# Patient Record
Sex: MALE | Race: WHITE
[De-identification: names, ages, dates, MRNs, and addresses within clinical notes are randomized per-mention and may not be internally consistent; named-entity substitution may affect disease eponyms.]

---

## 2019-08-29 ENCOUNTER — HOSPITAL ENCOUNTER (OUTPATIENT)
Dept: HOSPITAL 39 - RAD | Age: 64
End: 2019-08-29
Attending: ORTHOPAEDIC SURGERY
Payer: COMMERCIAL

## 2019-08-29 DIAGNOSIS — M22.2X1: Primary | ICD-10-CM

## 2019-08-29 DIAGNOSIS — R60.0: ICD-10-CM

## 2019-08-29 DIAGNOSIS — M25.861: ICD-10-CM

## 2019-08-29 DIAGNOSIS — M25.551: ICD-10-CM

## 2019-08-29 NOTE — RAD
EXAM DESCRIPTION: Knee,Right Complete



CLINICAL HISTORY: 64 years Male, PAIN IN RIGHT KNEE



COMPARISON: None.



TECHNIQUE: 4 views of the right knee.



IMPRESSION:

No acute displaced fracture. No dislocation.



Severe narrowing of the medial weightbearing joint space with

mild underlying subchondral sclerosis along the tibial plateau.

Small marginal osteophytes extend off the femoral condyles and

tibial plateau. The tibial plateau is intact. 



Mild patellofemoral arthrosis. Probable small knee joint

effusion. Lateral patellar tilt.



Mild July 16 edema. No radiopaque foreign body.  



Electronically signed by:  Yosvany Fisher MD  8/29/2019 1:59 PM CDT

Workstation: [id number]

## 2019-08-29 NOTE — RAD
EXAM DESCRIPTION: 



Pelvis



CLINICAL HISTORY: 



PAIN IN RIGHT HIP



COMPARISON: 



None.



IMPRESSION: 





AP supine view of the pelvis shows no acute fracture, focal bone

destruction, or joint dislocation.



No advanced arthrosis.





Electronically signed by:  Wallace Berry MD  8/29/2019 2:02 PM CDT

Workstation: [id number]

## 2019-12-09 ENCOUNTER — HOSPITAL ENCOUNTER (OUTPATIENT)
Dept: HOSPITAL 39 - LAB.O | Age: 64
End: 2019-12-09
Attending: NURSE PRACTITIONER
Payer: OTHER GOVERNMENT

## 2019-12-09 DIAGNOSIS — Z01.810: Primary | ICD-10-CM

## 2019-12-09 DIAGNOSIS — R91.8: ICD-10-CM

## 2019-12-09 NOTE — RAD
EXAM DESCRIPTION: 



Chest,2 Views



CLINICAL HISTORY: 



PRESURGICAL CLEARANCE



COMPARISON: 



None



FINDINGS: 



3 views of the chest.

No consolidation, effusion or pneumothorax is present.

Usual physiologic wedging of the midthoracic vertebral bodies.

Heart size is normal.

Flattening of the hemidiaphragms can be seen with good depth of

inspiration or COPD.



IMPRESSION: 



Good depth of inspiration versus COPD.

No acute radiographic abnormality.



Electronically signed by:  Randal Craig MD  12/9/2019 1:32 PM

[facility] Workstation: [id number]

## 2020-01-07 ENCOUNTER — HOSPITAL ENCOUNTER (OUTPATIENT)
Dept: HOSPITAL 39 - AMB | Age: 65
Discharge: HOME | End: 2020-01-07
Attending: ORTHOPAEDIC SURGERY
Payer: COMMERCIAL

## 2020-01-07 VITALS — TEMPERATURE: 98.3 F | OXYGEN SATURATION: 97 % | DIASTOLIC BLOOD PRESSURE: 83 MMHG | SYSTOLIC BLOOD PRESSURE: 131 MMHG

## 2020-01-07 DIAGNOSIS — Z53.9: ICD-10-CM

## 2020-01-07 DIAGNOSIS — M17.11: Primary | ICD-10-CM

## 2020-03-04 ENCOUNTER — HOSPITAL ENCOUNTER (INPATIENT)
Dept: HOSPITAL 39 - AMB | Age: 65
LOS: 2 days | Discharge: HOME | DRG: 470 | End: 2020-03-06
Attending: NURSE PRACTITIONER | Admitting: ORTHOPAEDIC SURGERY
Payer: COMMERCIAL

## 2020-03-04 DIAGNOSIS — Z88.0: ICD-10-CM

## 2020-03-04 DIAGNOSIS — Z79.1: ICD-10-CM

## 2020-03-04 DIAGNOSIS — Z79.899: ICD-10-CM

## 2020-03-04 DIAGNOSIS — E66.9: ICD-10-CM

## 2020-03-04 DIAGNOSIS — M17.11: Primary | ICD-10-CM

## 2020-03-04 DIAGNOSIS — F17.290: ICD-10-CM

## 2020-03-04 DIAGNOSIS — I48.20: ICD-10-CM

## 2020-03-04 DIAGNOSIS — H81.10: ICD-10-CM

## 2020-03-04 DIAGNOSIS — I95.9: ICD-10-CM

## 2020-03-04 DIAGNOSIS — M81.0: ICD-10-CM

## 2020-03-04 DIAGNOSIS — I10: ICD-10-CM

## 2020-03-04 DIAGNOSIS — E78.5: ICD-10-CM

## 2020-03-04 DIAGNOSIS — Z96.652: ICD-10-CM

## 2020-03-04 DIAGNOSIS — Z79.01: ICD-10-CM

## 2020-03-04 PROCEDURE — 3E0T3BZ INTRODUCTION OF ANESTHETIC AGENT INTO PERIPHERAL NERVES AND PLEXI, PERCUTANEOUS APPROACH: ICD-10-PCS

## 2020-03-04 PROCEDURE — 0SRC0J9 REPLACEMENT OF RIGHT KNEE JOINT WITH SYNTHETIC SUBSTITUTE, CEMENTED, OPEN APPROACH: ICD-10-PCS | Performed by: ORTHOPAEDIC SURGERY

## 2020-03-04 PROCEDURE — 3E0T33Z INTRODUCTION OF ANTI-INFLAMMATORY INTO PERIPHERAL NERVES AND PLEXI, PERCUTANEOUS APPROACH: ICD-10-PCS

## 2020-03-04 RX ADMIN — DOCUSATE CALCIUM SCH MG: 240 CAPSULE, LIQUID FILLED ORAL at 20:15

## 2020-03-04 RX ADMIN — METOPROLOL TARTRATE SCH MG: 50 TABLET ORAL at 20:14

## 2020-03-04 RX ADMIN — BUPIVACAINE ONE MG: 13.3 INJECTION, SUSPENSION, LIPOSOMAL INFILTRATION at 08:32

## 2020-03-04 RX ADMIN — CEFAZOLIN SCH MLS/HR: 1 INJECTION, POWDER, FOR SOLUTION INTRAMUSCULAR; INTRAVENOUS at 17:22

## 2020-03-04 RX ADMIN — VANCOMYCIN HYDROCHLORIDE SCH MLS/HR: 500 INJECTION, POWDER, LYOPHILIZED, FOR SOLUTION INTRAVENOUS at 17:53

## 2020-03-04 RX ADMIN — BUPIVACAINE HYDROCHLORIDE ONE ML: 5 INJECTION, SOLUTION EPIDURAL; INTRACAUDAL at 07:55

## 2020-03-04 RX ADMIN — VANCOMYCIN HYDROCHLORIDE ONE MG: 500 INJECTION, POWDER, LYOPHILIZED, FOR SOLUTION INTRAVENOUS at 07:55

## 2020-03-04 RX ADMIN — CELECOXIB SCH MG: 100 CAPSULE ORAL at 17:22

## 2020-03-04 RX ADMIN — CELECOXIB SCH: 100 CAPSULE ORAL at 12:57

## 2020-03-04 RX ADMIN — BUPIVACAINE ONE MG: 13.3 INJECTION, SUSPENSION, LIPOSOMAL INFILTRATION at 07:55

## 2020-03-04 RX ADMIN — Medication SCH: at 12:57

## 2020-03-04 RX ADMIN — ENOXAPARIN SODIUM SCH MG: 30 INJECTION, SOLUTION INTRAVENOUS; SUBCUTANEOUS at 22:55

## 2020-03-04 RX ADMIN — VANCOMYCIN HYDROCHLORIDE ONE MG: 500 INJECTION, POWDER, LYOPHILIZED, FOR SOLUTION INTRAVENOUS at 09:04

## 2020-03-04 RX ADMIN — BUPIVACAINE HYDROCHLORIDE ONE ML: 5 INJECTION, SOLUTION EPIDURAL; INTRACAUDAL at 08:32

## 2020-03-04 NOTE — RAD
Right knee 2 views



INDICATION: Total knee arthroplasty



IMPRESSION:



There is lucency possibly small fracture along the lateral margin

lateral femoral condyle on the AP film. Recommend follow-up

radiographs. Status post total knee arthroplasty. Mild

patellofemoral osteophyte formation.



Electronically signed by:  Will Rios MD  3/4/2020 2:39 PM

[facility] Workstation: [id number]

## 2020-03-04 NOTE — RAD
EXAM DESCRIPTION: Fluoroscopy Up to 1Hr



CLINICAL HISTORY: 64 years Male, RIGHT TKA



COMPARISON: None.



IMPRESSION: 

Multiple intraoperative fluoroscopic images saved for the benefit

of the surgeon. 



Total right knee arthroplasty.



Please see procedure report for full details.



Fluoroscopy time: 6.2 seconds

Fluoroscopic images: 1

Total dose: 1.02 mGy



Electronically signed by:  Yosvany Fisher MD  3/4/2020 10:27 PM [facility]

Workstation: [id number]

## 2020-03-04 NOTE — OP
DATE OF PROCEDURE:  03/04/20



PREOPERATIVE DIAGNOSIS: 

1.  Osteoarthritis of the right knee.



POSTOPERATIVE DIAGNOSIS: 

1.  Osteoarthritis of the right knee.



PROCEDURE: 

1.  Total knee arthroplasty.



SURGEON:  Robbin Robins MD.



ASSISTANT:  Jonah Maurer CST, SA-C.



ANESTHESIA:  General anesthesia.



COMPLICATIONS:  None.



FINDINGS:  Severe arthritis of the knee.



INDICATION:  Michael has a history of severe knee pain.  He has had difficulty 
with difficulty activities because of worsening of his discomfort.  We discussed
options and after discussing the risks, benefits and alternatives to operative 
therapy, the patient has given informed consent.



PROCEDURE:  The patient was brought to the Operating Room and placed in supine 
position.  General anesthesia was induced and the patient's leg was sterilely 
prepped and draped.  Following prepping and draping, the distal femur was 
exposed and using an intramedullary guide, the distal femoral cut was made.  The
appropriate sized cutting block was measured, pinned into place, and the 
anterior, posterior, and chamfer cuts were made.  The ACL was transected and the
tibia was subluxed.  Both the medial and lateral menisci were removed.  An 
intramedullary guide was used to make the proximal tibial cut.  The appropriate 
sized base plate was placed and a trial polyethylene was placed.  The trial 
femur was placed, the knee was reduced, and the knee was taken through a range 
of motion.  The knee was stable in anterior, posterior, varus and valgus stress.
 The patella tracked anatomically without evidence of subluxation or 
dislocation.  After trialing, the trial components were removed and the bony 
surfaces were thoroughly irrigated with saline.  Following irrigation, the 
surfaces were dried and the final components were cemented into place.  The 
excess cement was removed and the remaining cement was allowed to cure.  The 
knee was again taken through a range of motion to confirm stability.  The wound 
was then irrigated with saline and closure was performed using PDS to 
approximate the arthrotomy followed by closure of the subcutaneous tissues with 
a combination of running and interrupted Monocryl sutures.  Sterile dressing was
placed.  The patient was awoken from anesthesia and taken to Recovery.



COMPONENTS:  Nicholas Triathlon knee, size 6 femur, size 6 tibia, 9 mm insert.



POSTOPERATIVE PLAN:  The patient will be weight-bearing as tolerated on 
postoperative day 1. 



#[id number]

MTDD

## 2020-03-04 NOTE — CONS
SUPERVISING PHYSICIAN:  Scar Sin MD 



CHIEF COMPLAINT:  Right knee pain.



HISTORY OF PRESENT ILLNESS:   This is a 64 year-old male patient who has a 
significant history of osteoarthritis of his bilateral knees with bilateral knee
pain.  He has had his left knee replaced and he has tried conservative measures 
and failed to gain any relief with his pain and he requested Dr. Robbin Robins, 
orthopedic surgeon, for operative intervention and he was admitted to the 
hospital today for a right total knee arthroplasty.  There were no problems 
intraoperatively.  Postoperatively, he initially became fairly diaphoretic with 
a systolic blood pressure  in the 80s.  He received a bolus of fluid and cardiac
enzymes were run.  There were no EKG changes and his cardiac enzymes were 
negative.  Since that time, he is awake and alert.  He has had no more problems 
with diaphoresis.  There were never any complaints of chest pain, nausea or 
vomiting.



PAST MEDICAL HISTORY: 

1.  Hypertension.

2.  Hyperlipidemia.

3.  Atrial fibrillation.

4.  Benign positional vertigo.



PAST SURGICAL HISTORY:  

1.  Left knee replacement.



CURRENT MEDICATIONS: 

1.  Mobic.

2.  Xarelto.

3.  Lisinopril Hydrochlorothiazide.

4.  Metoprol. tartrate.

5.  Crestor.



ALLERGIES:   Penicillin.



SOCIAL HISTORY:   He lives in [place], he is . He does not smoke tobacco 
but he does dip snuff.  He drinks beer occasionally and he denies any illicit 
drug use.



REVIEW OF SYSTEMS:   Negative except as per history of present illness.



PHYSICAL EXAMINATION: 



VITAL SIGNS:  Temperature 96.7, heart rate 100, blood pressure 104/56, 
respirations 16, oxygen saturation 93% on room air.



GENERAL:  This is a 64 year-old male patient lying in his hospital bed.  He is 
in no acute distress.



HEENT:  Normocephalic and atraumatic.  Pupils are equal and reactive.  
Oropharynx is clear.



NECK:  Supple without mass.



CHEST:  Essentially clear to auscultation bilaterally.



HEART:  Regular rate and rhythm.



ABDOMEN:  Soft, nondistended, non-tender.  Bowel sounds are positive.



EXTREMITIES:  His right leg is in the CPM machine.  He has an Ace bandage that 
is try and intact.  His bilateral pedal pulses are palpable +2.



NEUROLOGIC:  He is awake, alert, and oriented x3.  Cranial nerves II through XII
are grossly intact as tested.



LABORATORY:  CBC is unremarkable.  Electrolytes are basically within normal 
limits.  Blood sugar is slightly high at 143.  Cardiac enzymes are negative.  
Urinalysis is unremarkable.  Urine drug screen is negative.  X-rays are as per 
the history of present illness.



IMPRESSION:

1.  Osteoarthritis of the right knee status post right total knee arthroplasty

      performed by Dr. Robbin Robins, orthopedic surgeon, postoperative #0.

2.  Hypertension.

3.  Hyperlipidemia.

4.  Chronic atrial fibrillation on Xarelto.

5.  Benign positional vertigo.



PLAN:   We will continue present supportive care.  Orthopedic issues will be per
Dr. Robbin Robins, orthopedic surgeon.  He will begin physical therapy for 
strengthening and conditioning tomorrow.  I have restarted his home medications 
with the exception of his Mobic and his Xarelto.  I have ordered Meclizine for 
his vertigo.  I have encouraged good pulmonary hygiene.  We will continue to 
monitor him closely and follow as needed.



#[id number]

[facility]

## 2020-03-05 RX ADMIN — ENOXAPARIN SODIUM SCH MG: 30 INJECTION, SOLUTION INTRAVENOUS; SUBCUTANEOUS at 23:01

## 2020-03-05 RX ADMIN — LISINOPRIL SCH MG: 10 TABLET ORAL at 08:20

## 2020-03-05 RX ADMIN — Medication SCH MG: at 08:19

## 2020-03-05 RX ADMIN — CYCLOBENZAPRINE HYDROCHLORIDE PRN MG: 10 TABLET, FILM COATED ORAL at 17:15

## 2020-03-05 RX ADMIN — DOCUSATE CALCIUM SCH MG: 240 CAPSULE, LIQUID FILLED ORAL at 20:37

## 2020-03-05 RX ADMIN — HYDROCODONE BITARTRATE AND ACETAMINOPHEN PRN EA: 10; 325 TABLET ORAL at 08:19

## 2020-03-05 RX ADMIN — ENOXAPARIN SODIUM SCH MG: 30 INJECTION, SOLUTION INTRAVENOUS; SUBCUTANEOUS at 12:18

## 2020-03-05 RX ADMIN — CELECOXIB SCH MG: 100 CAPSULE ORAL at 17:15

## 2020-03-05 RX ADMIN — METOPROLOL TARTRATE SCH MG: 50 TABLET ORAL at 08:19

## 2020-03-05 RX ADMIN — METOPROLOL TARTRATE SCH MG: 50 TABLET ORAL at 20:37

## 2020-03-05 RX ADMIN — VANCOMYCIN HYDROCHLORIDE SCH MLS/HR: 500 INJECTION, POWDER, LYOPHILIZED, FOR SOLUTION INTRAVENOUS at 05:37

## 2020-03-05 RX ADMIN — CEFAZOLIN SCH MLS/HR: 1 INJECTION, POWDER, FOR SOLUTION INTRAMUSCULAR; INTRAVENOUS at 08:25

## 2020-03-05 RX ADMIN — CEFAZOLIN SCH MLS/HR: 1 INJECTION, POWDER, FOR SOLUTION INTRAMUSCULAR; INTRAVENOUS at 00:21

## 2020-03-05 RX ADMIN — CYCLOBENZAPRINE HYDROCHLORIDE PRN MG: 10 TABLET, FILM COATED ORAL at 08:20

## 2020-03-05 RX ADMIN — CELECOXIB SCH MG: 100 CAPSULE ORAL at 08:19

## 2020-03-05 RX ADMIN — HYDROCODONE BITARTRATE AND ACETAMINOPHEN PRN EA: 10; 325 TABLET ORAL at 20:36

## 2020-03-05 NOTE — PN
DATE:  03/05/20



SUBJECTIVE:  Michael is doing really well and says his pain is well controlled.



OBJECTIVE:  Afebrile.  Vital signs stable.  Dressing is clean, dry and intact.



ASSESSMENT:  Status post total knee arthroplasty.



PLAN:  He will begin weightbearing as tolerated today.  



#[id number]

MTDD

## 2020-03-06 VITALS — OXYGEN SATURATION: 98 %

## 2020-03-06 VITALS — SYSTOLIC BLOOD PRESSURE: 121 MMHG | TEMPERATURE: 98 F | DIASTOLIC BLOOD PRESSURE: 72 MMHG

## 2020-03-06 RX ADMIN — METOPROLOL TARTRATE SCH MG: 50 TABLET ORAL at 09:36

## 2020-03-06 RX ADMIN — LISINOPRIL SCH MG: 10 TABLET ORAL at 09:36

## 2020-03-06 RX ADMIN — HYDROCODONE BITARTRATE AND ACETAMINOPHEN PRN EA: 10; 325 TABLET ORAL at 09:37

## 2020-03-06 RX ADMIN — CELECOXIB SCH MG: 100 CAPSULE ORAL at 09:36

## 2020-03-06 RX ADMIN — CYCLOBENZAPRINE HYDROCHLORIDE PRN MG: 10 TABLET, FILM COATED ORAL at 09:36

## 2020-03-06 RX ADMIN — ENOXAPARIN SODIUM SCH MG: 30 INJECTION, SOLUTION INTRAVENOUS; SUBCUTANEOUS at 12:02

## 2020-03-06 RX ADMIN — Medication SCH MG: at 09:36

## 2020-03-06 NOTE — PN
SUPERVISING PHYSICIAN:  Scar Sin MD



DATE:  03/05/20



SUBJECTIVE:  The patient is lying in bed asleep.  He awakens easily.  He said 
his leg has been having a few spasms in it, but he is not in much pain.  He 
denies any chest pain, shortness of breath, nausea or vomiting.  



OBJECTIVE:  

VITAL SIGNS:  Temperature 98.1.  Heart rate 85.  Blood pressure 118/73.  
Respiratory rate 18.  O2 saturation 98% on room air.  

RESPIRATORY:  Essentially clear to auscultation bilaterally.  

CARDIAC: Regular rate and rhythm.  

GASTROINTESTINAL: Abdomen is soft, nondistended, nontender.  Bowel sounds are 
positive.  

EXTREMITIES: Bilateral pedal pulses are palpable at +2.  His right knee has a 
dressing that is dry and intact.  

NEUROLOGIC: Awake, alert and oriented times three.  



LABORATORY:  Hemoglobin 12.1, hematocrit 36.8.  All other labs and films have 
been reviewed via the EMR.  



ASSESSMENT: 

1.  Osteoarthritis of the right knee status post right total knee arthroplasty

      performed by Dr. Robbin Robins, orthopedic surgeon, postoperative #1.

2.  Hypertension.

3.  Hyperlipidemia.

4.  Chronic atrial fibrillation on Xarelto.

5.  Benign positional vertigo.



PLAN:  We will continue present supportive care.  Orthopedic issues will be per 
Dr. Robbin Robins, orthopedic surgeon.  He will continue with physical therapy for
strengthening and conditioning.  I have encouraged good pulmonary hygiene.  
Discharge planning is underway and he will use physical therapy from 
[place].  Hopefully he will be discharged in the next 1 to 2 days.  We will
continue to monitor the patient closely and follow as needed.  



#[id number]

[facility]

## 2020-03-11 NOTE — DS
SUPERVISING PHYSICIAN:   Scar Sin MD



DISCHARGE DIAGNOSIS: 

1.  Osteoarthritis of the right knee status post right total knee arthroplasty

     performed by Dr. Robbin Robins, orthopedic surgeon, postoperative #2.

2.  Hypertension.

3.  Hyperlipidemia.

4.  Chronic atrial fibrillation on Xarelto.

5.  Benign positional vertigo.



HISTORY OF PRESENT ILLNESS: This is a 64 year-old male patient who was admitted 
to the hospital on the day of his procedure.  He has had a long history of 
osteoarthritis of his bilateral knees.  He has had his left knee replaced 
several years ago.  He has tried conservative measures and failed to gain any 
relief with his right knee pain and he requested Dr. Robbin Robins, orthopedic 
surgeon, for operative intervention.  He was admitted for the operative 
procedure and there were no problems intraoperatively.  Postoperatively, he 
initially was diaphoretic, but after giving him some fluids and getting the pain
under control, there were no further problems.  A set of cardiac enzymes were 
run and were negative.  There were no EKG changes.  Since that time, he has had 
no issues.  He has met his physical therapy goals.  His orthopedic issues were 
addressed by Dr. Robins.  He is ready to be discharged home in stable condition.  



LABORATORY:  Postoperative hemoglobin 12.1, hematocrit 36.8.  Cardiac enzymes 
were all negative.  Urine drug screen was negative.



DISCHARGE PLAN:  The patient will be discharged home in stable condition.  He is
to resume his previous diet.  His activity will be as per physical therapy.  He 
will go to the [facility] outpatient physical therapy department.  He has a 
followup appointment with Dr. Robins on 03/20/20 at 10:30 AM.  He is on Xarelto 
routinely and he is to continue that for his anticoagulant therapy.  He is to 
resume his previous dosage.  He is to resume his previous medications.  In 
addition to his routine medications, he will have Flexeril as well as some 
hydrocodone.  He is to return to the hospital or followup with Dr. Robins for any 
problems or complications.



DISCHARGE MEDICATIONS:

1.  Crestor.

2.  Xarelto.

3.  Lopressor.

4.  Mobic.

5.  Lisinopril/hydrochlorothiazide.

6.  Cyclobenzaprine.

7.  Hydrocodone.



#[id number]

MTDD